# Patient Record
Sex: FEMALE | Race: OTHER | NOT HISPANIC OR LATINO | Employment: PART TIME | URBAN - METROPOLITAN AREA
[De-identification: names, ages, dates, MRNs, and addresses within clinical notes are randomized per-mention and may not be internally consistent; named-entity substitution may affect disease eponyms.]

---

## 2023-09-20 ENCOUNTER — OFFICE VISIT (OUTPATIENT)
Dept: PRIMARY CARE | Facility: CLINIC | Age: 33
End: 2023-09-20
Payer: COMMERCIAL

## 2023-09-20 VITALS
BODY MASS INDEX: 31.33 KG/M2 | OXYGEN SATURATION: 98 % | HEIGHT: 60 IN | WEIGHT: 159.6 LBS | SYSTOLIC BLOOD PRESSURE: 118 MMHG | DIASTOLIC BLOOD PRESSURE: 87 MMHG | HEART RATE: 70 BPM

## 2023-09-20 DIAGNOSIS — Z00.00 ANNUAL PHYSICAL EXAM: ICD-10-CM

## 2023-09-20 DIAGNOSIS — N83.9 LESION OF RIGHT OVARY: Primary | ICD-10-CM

## 2023-09-20 PROCEDURE — 99214 OFFICE O/P EST MOD 30 MIN: CPT | Performed by: INTERNAL MEDICINE

## 2023-09-20 PROCEDURE — 1036F TOBACCO NON-USER: CPT | Performed by: INTERNAL MEDICINE

## 2023-09-20 RX ORDER — FAMOTIDINE 20 MG/1
20 TABLET, FILM COATED ORAL DAILY PRN
COMMUNITY

## 2023-09-20 RX ORDER — MULTIVITAMIN
1 TABLET ORAL DAILY
COMMUNITY

## 2023-09-20 ASSESSMENT — PATIENT HEALTH QUESTIONNAIRE - PHQ9
1. LITTLE INTEREST OR PLEASURE IN DOING THINGS: NOT AT ALL
SUM OF ALL RESPONSES TO PHQ9 QUESTIONS 1 AND 2: 0
2. FEELING DOWN, DEPRESSED OR HOPELESS: NOT AT ALL

## 2023-09-20 ASSESSMENT — ENCOUNTER SYMPTOMS
FEVER: 0
PALPITATIONS: 0
ARTHRALGIAS: 0
MYALGIAS: 0
DYSURIA: 0
HEMATURIA: 0
HEADACHES: 0
CHILLS: 0
DIFFICULTY URINATING: 0
DIZZINESS: 0
DIARRHEA: 0
WEAKNESS: 0
COUGH: 0
FREQUENCY: 0
SORE THROAT: 0
LIGHT-HEADEDNESS: 0
FATIGUE: 0
CONSTIPATION: 0
SHORTNESS OF BREATH: 0
NAUSEA: 0
VOMITING: 0

## 2023-09-20 ASSESSMENT — PAIN SCALES - GENERAL: PAINLEVEL: 0-NO PAIN

## 2023-09-20 NOTE — PROGRESS NOTES
Subjective   Patient ID: Elizabeth Jorge is a 33 y.o. female who presents for lesion on the right ovary and general health management.  BN    Chart was reconciled today as the patient is new to [facility].  Patient was not being evaluated by urologist for kidney stones when they found a lesion on her right ovary.  Patient follows on a CAT scan and wanted follow-up.  She has an appointment with an OB/GYN but she could not get in for another 2 months so she is seeing me first.    Patient has a few spotting for her menstrual cycle for few days followed by a few heavy days and then spotting again.  Menstrual cycles are regular and have been like this for some time now.  She has no specific change or new symptoms such as severe abdominal pain or cramping.  She does have pain and cramping but it is at about its baseline for her.  Her mother does have breast cancer but it was tested and wasNegative for all genetic components and patient has no other family members with any breast or ovarian cancers.        Review of Systems   Constitutional:  Negative for chills, fatigue and fever.   HENT:  Negative for sore throat.    Eyes:  Negative for visual disturbance.   Respiratory:  Negative for cough and shortness of breath.    Cardiovascular:  Negative for chest pain, palpitations and leg swelling.   Gastrointestinal:  Negative for constipation, diarrhea, nausea and vomiting.   Genitourinary:  Negative for difficulty urinating, dysuria, frequency, hematuria and urgency.   Musculoskeletal:  Negative for arthralgias and myalgias.   Skin:  Negative for rash.   Neurological:  Negative for dizziness, syncope, weakness, light-headedness and headaches.       Objective   Medication Documentation Review Audit    **Prior to Admission medications have not yet been reviewed**       Allergies   Allergen Reactions    Hydrocodone Itching     Physical Exam  Constitutional:       Appearance: Normal appearance.   HENT:      Head: Normocephalic and  atraumatic.      Nose: Nose normal.   Eyes:      Extraocular Movements: Extraocular movements intact.      Pupils: Pupils are equal, round, and reactive to light.   Cardiovascular:      Rate and Rhythm: Normal rate and regular rhythm.   Pulmonary:      Breath sounds: Normal breath sounds.   Abdominal:      General: Abdomen is flat. Bowel sounds are normal.      Palpations: Abdomen is soft.   Musculoskeletal:      Right lower leg: No edema.      Left lower leg: No edema.   Neurological:      Mental Status: She is alert.     /87 (BP Location: Left arm, Patient Position: Sitting)   Pulse 70   Ht 1.524 m (5') Comment: with shoes  Wt 72.4 kg (159 lb 9.6 oz)   SpO2 98%   BMI 31.17 kg/m²       Assessment/Plan   Problem List Items Addressed This Visit       Annual physical exam     Patient has not been seen in a couple of years so I will check baseline labs as well.         Relevant Orders    Lipid Panel    CBC    Comprehensive Metabolic Panel    TSH with reflex to Free T4 if abnormal    Vitamin D 25-Hydroxy,Total (for eval of Vitamin D levels)    Lesion of right ovary - Primary     2.5 cm incidental cyst and/or lesion found on the right ovary.  Recommend ultrasound transvaginal and trans abdominal for further evaluation.  If this is normal no further testing is needed however if abnormalities are found we will determine at that time if she needs follow-up and with him.             Relevant Orders    US pelvis transvaginal    US pelvis              It has been a pleasure seeing you.  Halima Lew MD

## 2023-09-20 NOTE — ASSESSMENT & PLAN NOTE
2.5 cm incidental cyst and/or lesion found on the right ovary.  Recommend ultrasound transvaginal and trans abdominal for further evaluation.  If this is normal no further testing is needed however if abnormalities are found we will determine at that time if she needs follow-up and with him.

## 2023-10-02 ENCOUNTER — ANCILLARY PROCEDURE (OUTPATIENT)
Dept: RADIOLOGY | Facility: CLINIC | Age: 33
End: 2023-10-02
Payer: COMMERCIAL

## 2023-10-02 ENCOUNTER — LAB (OUTPATIENT)
Dept: LAB | Facility: LAB | Age: 33
End: 2023-10-02
Payer: COMMERCIAL

## 2023-10-02 DIAGNOSIS — N83.9 LESION OF RIGHT OVARY: ICD-10-CM

## 2023-10-02 DIAGNOSIS — Z00.00 ANNUAL PHYSICAL EXAM: ICD-10-CM

## 2023-10-02 LAB
25(OH)D3 SERPL-MCNC: 34 NG/ML (ref 30–100)
ALBUMIN SERPL BCP-MCNC: 4.3 G/DL (ref 3.4–5)
ALP SERPL-CCNC: 60 U/L (ref 33–110)
ALT SERPL W P-5'-P-CCNC: 16 U/L (ref 7–45)
ANION GAP SERPL CALC-SCNC: 8 MMOL/L (ref 10–20)
AST SERPL W P-5'-P-CCNC: 13 U/L (ref 9–39)
BILIRUB SERPL-MCNC: 0.3 MG/DL (ref 0–1.2)
BUN SERPL-MCNC: 10 MG/DL (ref 6–23)
CALCIUM SERPL-MCNC: 9.2 MG/DL (ref 8.6–10.6)
CHLORIDE SERPL-SCNC: 106 MMOL/L (ref 98–107)
CHOLEST SERPL-MCNC: 184 MG/DL (ref 0–199)
CHOLESTEROL/HDL RATIO: 3.6
CO2 SERPL-SCNC: 29 MMOL/L (ref 21–32)
CREAT SERPL-MCNC: 0.73 MG/DL (ref 0.5–1.05)
ERYTHROCYTE [DISTWIDTH] IN BLOOD BY AUTOMATED COUNT: 15.9 % (ref 11.5–14.5)
GFR SERPL CREATININE-BSD FRML MDRD: >90 ML/MIN/1.73M*2
GLUCOSE SERPL-MCNC: 93 MG/DL (ref 74–99)
HCT VFR BLD AUTO: 37.2 % (ref 36–46)
HDLC SERPL-MCNC: 50.6 MG/DL
HGB BLD-MCNC: 12.2 G/DL (ref 12–16)
LDLC SERPL CALC-MCNC: 99 MG/DL (ref 130–180)
MCH RBC QN AUTO: 27.1 PG (ref 26–34)
MCHC RBC AUTO-ENTMCNC: 32.8 G/DL (ref 32–36)
MCV RBC AUTO: 83 FL (ref 80–100)
NON HDL CHOLESTEROL: 133 MG/DL (ref 0–149)
NRBC BLD-RTO: 0 /100 WBCS (ref 0–0)
PLATELET # BLD AUTO: 248 X10*3/UL (ref 150–450)
PMV BLD AUTO: 10.5 FL (ref 7.5–11.5)
POTASSIUM SERPL-SCNC: 4.2 MMOL/L (ref 3.5–5.3)
PROT SERPL-MCNC: 6.4 G/DL (ref 6.4–8.2)
RBC # BLD AUTO: 4.5 X10*6/UL (ref 4–5.2)
SODIUM SERPL-SCNC: 139 MMOL/L (ref 136–145)
TRIGL SERPL-MCNC: 171 MG/DL (ref 0–149)
TSH SERPL-ACNC: 2.02 MIU/L (ref 0.44–3.98)
VLDL: 34 MG/DL (ref 0–40)
WBC # BLD AUTO: 5.8 X10*3/UL (ref 4.4–11.3)

## 2023-10-02 PROCEDURE — 84443 ASSAY THYROID STIM HORMONE: CPT

## 2023-10-02 PROCEDURE — 76830 TRANSVAGINAL US NON-OB: CPT | Performed by: RADIOLOGY

## 2023-10-02 PROCEDURE — 76856 US EXAM PELVIC COMPLETE: CPT | Performed by: RADIOLOGY

## 2023-10-02 PROCEDURE — 76830 TRANSVAGINAL US NON-OB: CPT

## 2023-10-02 PROCEDURE — 85027 COMPLETE CBC AUTOMATED: CPT

## 2023-10-02 PROCEDURE — 80061 LIPID PANEL: CPT

## 2023-10-02 PROCEDURE — 76856 US EXAM PELVIC COMPLETE: CPT

## 2023-10-02 PROCEDURE — 80053 COMPREHEN METABOLIC PANEL: CPT

## 2023-10-02 PROCEDURE — 36415 COLL VENOUS BLD VENIPUNCTURE: CPT

## 2023-10-02 PROCEDURE — 82306 VITAMIN D 25 HYDROXY: CPT

## 2023-10-03 ENCOUNTER — TELEPHONE (OUTPATIENT)
Dept: PRIMARY CARE | Facility: CLINIC | Age: 33
End: 2023-10-03
Payer: COMMERCIAL

## 2023-10-04 ENCOUNTER — TELEPHONE (OUTPATIENT)
Dept: PRIMARY CARE | Facility: CLINIC | Age: 33
End: 2023-10-04
Payer: COMMERCIAL

## 2023-10-23 PROBLEM — O24.419 GESTATIONAL DIABETES MELLITUS (HHS-HCC): Status: ACTIVE | Noted: 2023-10-23

## 2023-10-23 PROBLEM — M79.10 MYALGIA: Status: ACTIVE | Noted: 2023-10-23

## 2023-10-23 PROBLEM — M53.3 PAIN OF LEFT SACROILIAC JOINT: Status: ACTIVE | Noted: 2023-10-23

## 2023-10-23 PROBLEM — R06.02 SOB (SHORTNESS OF BREATH): Status: ACTIVE | Noted: 2023-10-23

## 2023-10-23 PROBLEM — R10.32 LEFT LOWER QUADRANT PAIN: Status: ACTIVE | Noted: 2023-10-23

## 2023-10-23 PROBLEM — M54.30 SCIATICA: Status: ACTIVE | Noted: 2023-10-23

## 2023-10-23 PROBLEM — R10.13 EPIGASTRIC PAIN: Status: ACTIVE | Noted: 2023-10-23

## 2023-10-23 PROBLEM — M79.7 FIBROMYALGIA: Status: ACTIVE | Noted: 2023-10-23

## 2023-10-23 PROBLEM — J45.909 HYPERACTIVE AIRWAY DISEASE (HHS-HCC): Status: ACTIVE | Noted: 2023-10-23

## 2023-10-23 PROBLEM — M25.552 LEFT HIP PAIN: Status: ACTIVE | Noted: 2023-10-23

## 2023-10-23 RX ORDER — CELECOXIB 100 MG/1
1 CAPSULE ORAL 2 TIMES DAILY PRN
COMMUNITY
End: 2023-10-25

## 2023-10-23 RX ORDER — ACETAMINOPHEN 500 MG
5000 TABLET ORAL DAILY
COMMUNITY
End: 2023-10-25

## 2023-10-25 ENCOUNTER — OFFICE VISIT (OUTPATIENT)
Dept: OBSTETRICS AND GYNECOLOGY | Facility: CLINIC | Age: 33
End: 2023-10-25
Payer: COMMERCIAL

## 2023-10-25 VITALS
BODY MASS INDEX: 31.69 KG/M2 | DIASTOLIC BLOOD PRESSURE: 87 MMHG | SYSTOLIC BLOOD PRESSURE: 130 MMHG | HEIGHT: 60 IN | WEIGHT: 161.4 LBS

## 2023-10-25 DIAGNOSIS — N92.6 IRREGULAR MENSES: ICD-10-CM

## 2023-10-25 DIAGNOSIS — Z12.31 OTHER SCREENING MAMMOGRAM: ICD-10-CM

## 2023-10-25 DIAGNOSIS — Z01.419 ENCOUNTER FOR ANNUAL ROUTINE GYNECOLOGICAL EXAMINATION: Primary | ICD-10-CM

## 2023-10-25 DIAGNOSIS — Z01.419 PAP SMEAR, AS PART OF ROUTINE GYNECOLOGICAL EXAMINATION: ICD-10-CM

## 2023-10-25 PROCEDURE — 3048F LDL-C <100 MG/DL: CPT | Performed by: OBSTETRICS & GYNECOLOGY

## 2023-10-25 PROCEDURE — 99213 OFFICE O/P EST LOW 20 MIN: CPT | Performed by: OBSTETRICS & GYNECOLOGY

## 2023-10-25 PROCEDURE — 1036F TOBACCO NON-USER: CPT | Performed by: OBSTETRICS & GYNECOLOGY

## 2023-10-25 PROCEDURE — 88175 CYTOPATH C/V AUTO FLUID REDO: CPT

## 2023-10-25 PROCEDURE — 3075F SYST BP GE 130 - 139MM HG: CPT | Performed by: OBSTETRICS & GYNECOLOGY

## 2023-10-25 PROCEDURE — 3079F DIAST BP 80-89 MM HG: CPT | Performed by: OBSTETRICS & GYNECOLOGY

## 2023-10-25 PROCEDURE — 87624 HPV HI-RISK TYP POOLED RSLT: CPT

## 2023-10-25 RX ORDER — NORGESTIMATE AND ETHINYL ESTRADIOL 7DAYSX3 28
1 KIT ORAL DAILY
Qty: 28 TABLET | Refills: 11 | Status: SHIPPED | OUTPATIENT
Start: 2023-10-25 | End: 2024-10-24

## 2023-10-25 NOTE — PROGRESS NOTES
Subjective   Patient ID: Elizabeth Jorge is a 33 y.o. female who presents for Abnormal CT Scan (New patient here for ovarian cyst on ct scan--denies any pain/Declined chaperone).  HPI  Patient is a 33-year-old  4 para 3 white female whose had 3  sections and 1 miscarriage.  Her last menstrual period was 2023 she said they typically come 1 month apart normally she would bleed for about 6 days but recently would have some spotting just before and just after sometimes lasting as long as 10 days.  Currently does not use anything for birth control.  She admits to regular bowel movements denies any urinary symptoms.  Medical history significant for kidney stones.  Surgical history significant for C-sections as above and colposcopy.  Patient states she had a normal Pap smear about 3 years ago.  She denies cigarette smoking currently works as a nurse at [employer].  Family history significant for mother with breast cancer.    Patient had CAT scan in 2023 for kidney stone was noted to have 2.5 cm lesion on right ovary.  Had ultrasound done 2023 which was completely normal.  Review of Systems  Abdomen: No abdominal pain, nausea, vomiting, diarrhea, or constipation. No bloating, early satiety, indigestion, or increased flatulence.  Bladder: No dysuria, gross hematuria, urinary frequency, urinary urgency, or incontinence.  Breast: No breast lumps, nipple discharge, overlying skin changes, redness or skin retraction.  Objective   Physical Exam  Gen.: Alert and in no acute distress. Well-developed, well-nourished.  Thyroid: Nonenlarged and no palpable thyroid nodules  Cardiovascular: Heart regular rate and rhythm  Pulmonary: Clear bilateral breath sounds  Breasts: Normal appearance, no nipple discharge and no skin changes. No palpable masses and no axillary lymphadenopathy  Abdomen: Soft and nontender, no abdominal mass palpated, no organomegaly   Pelvic: External genitalia normal,  Bartholin's urethral and [place]'s glands normal. Vagina normal. Cervix normal. Uterus normal size and shape. Right adnexa normal without masses. Left adnexa normal without masses. Perianal area and normal.  Assessment/Plan   Annual GYN exam, Pap and HPV done, patient given a requisition for mammogram due to history of mother with breast cancer.    Reviewed CAT scan and ultrasound findings at length with patient, at this point suspect functional cyst was noted on CAT scan.  We will follow expectantly in light of normal ultrasound.    Irregular menses.  Discussed history at length, will begin low-dose birth control pills.  Risk benefits alternatives explained and she agreed.  Patient will contact office in 3 months to reassess her symptoms.  She will follow-up in 1 year or as needed.

## 2023-11-07 LAB
CYTOLOGY CMNT CVX/VAG CYTO-IMP: NORMAL
HPV HR GENOTYPES PNL CVX NAA+PROBE: NEGATIVE
LAB AP HPV GENOTYPE QUESTION: NO
LAB AP HPV HR: NORMAL
LABORATORY COMMENT REPORT: NORMAL
LMP START DATE: NORMAL
PATH REPORT.TOTAL CANCER: NORMAL

## 2023-11-30 ENCOUNTER — OFFICE VISIT (OUTPATIENT)
Dept: PRIMARY CARE | Facility: CLINIC | Age: 33
End: 2023-11-30
Payer: COMMERCIAL

## 2023-11-30 VITALS
OXYGEN SATURATION: 98 % | HEIGHT: 60 IN | DIASTOLIC BLOOD PRESSURE: 77 MMHG | WEIGHT: 162.4 LBS | SYSTOLIC BLOOD PRESSURE: 104 MMHG | HEART RATE: 79 BPM | BODY MASS INDEX: 31.88 KG/M2

## 2023-11-30 DIAGNOSIS — Z00.00 ANNUAL PHYSICAL EXAM: Primary | ICD-10-CM

## 2023-11-30 PROBLEM — R06.02 SOB (SHORTNESS OF BREATH): Status: RESOLVED | Noted: 2023-10-23 | Resolved: 2023-11-30

## 2023-11-30 PROBLEM — J45.909 HYPERACTIVE AIRWAY DISEASE (HHS-HCC): Status: RESOLVED | Noted: 2023-10-23 | Resolved: 2023-11-30

## 2023-11-30 PROBLEM — R10.13 EPIGASTRIC PAIN: Status: RESOLVED | Noted: 2023-10-23 | Resolved: 2023-11-30

## 2023-11-30 PROCEDURE — 3048F LDL-C <100 MG/DL: CPT | Performed by: INTERNAL MEDICINE

## 2023-11-30 PROCEDURE — 3078F DIAST BP <80 MM HG: CPT | Performed by: INTERNAL MEDICINE

## 2023-11-30 PROCEDURE — 3074F SYST BP LT 130 MM HG: CPT | Performed by: INTERNAL MEDICINE

## 2023-11-30 PROCEDURE — 99395 PREV VISIT EST AGE 18-39: CPT | Performed by: INTERNAL MEDICINE

## 2023-11-30 PROCEDURE — 1036F TOBACCO NON-USER: CPT | Performed by: INTERNAL MEDICINE

## 2023-11-30 ASSESSMENT — ENCOUNTER SYMPTOMS
MYALGIAS: 0
ARTHRALGIAS: 0
WEAKNESS: 0
DYSURIA: 0
DIFFICULTY URINATING: 0
FEVER: 0
FREQUENCY: 0
HEADACHES: 0
CHILLS: 0
VOMITING: 0
FATIGUE: 0
LIGHT-HEADEDNESS: 0
PALPITATIONS: 0
DIZZINESS: 0
HEMATURIA: 0
SHORTNESS OF BREATH: 0
DIARRHEA: 0
COUGH: 0
NAUSEA: 0
CONSTIPATION: 0
SORE THROAT: 0

## 2023-11-30 ASSESSMENT — PAIN SCALES - GENERAL: PAINLEVEL: 0-NO PAIN

## 2023-11-30 NOTE — ASSESSMENT & PLAN NOTE
Patient just completed lab work and it was reviewed with her in detail today.  She notes she is actually having less frequent and severe headaches.  In the evenings she feels PVCs and I have asked her to correlate them and see if they are related to more stress, elevated in the take of caffeine or if she gets more of them related to her menstrual cycle    Patient has noticed some elevated blood pressure readings at home as well.  She is to start recording the day time blood pressure and pulse and see if there is or any correlation with her menstrual cycle, salt intake or travel.  I have also recommended she wear compression hose anytime she started getting leg swelling, works too much or is in a car for travel more than 2 hours.    If her blood pressure readings are consistently over 130/80 she is to bring them into me and see me for possible evaluation of hypertension    Unless something else comes up I would like to see her back in 1 year for an annual physical.

## 2023-11-30 NOTE — PROGRESS NOTES
Subjective   Patient ID: Elizabeth Jorge is a 33 y.o. female who presents for an annual physical.  BN    Patient is here for an annual physical.  She sees Dr. Deutsch for her annual Pap and pelvic.  She has noticed some elevated blood pressure readings from home periodically although in the office today it is good.  She has also noticed intermittent some pitting leg edema.  Typically last for a day or 2 and then subsides.  She has not noticed if there is a correlation between her menstrual cycles work salty intake or temperature with the swelling in her ankles.              Review of Systems   Constitutional:  Negative for chills, fatigue and fever.   HENT:  Negative for sore throat.    Eyes:  Negative for visual disturbance.   Respiratory:  Negative for cough and shortness of breath.    Cardiovascular:  Negative for chest pain, palpitations and leg swelling.        Patient denies having palpitations but says in the evenings when she is relaxing she can feel extra beats or PVCs.  She has recorded them and called them on her Apple Watch and notices it is just a few random beats.  She does not have any associated symptoms such as chest tightness or shortness of breath with them.   Gastrointestinal:  Negative for constipation, diarrhea, nausea and vomiting.   Genitourinary:  Negative for difficulty urinating, dysuria, frequency, hematuria and urgency.   Musculoskeletal:  Negative for arthralgias and myalgias.   Skin:  Negative for rash.   Neurological:  Negative for dizziness, syncope, weakness, light-headedness and headaches.       Objective   Medication Documentation Review Audit       Reviewed by Halima Lew MD (Physician) on 11/30/23 at 0954      Medication Order Taking? Sig Documenting Provider Last Dose Status   famotidine (Pepcid) 20 mg tablet [id number]  Take 1 tablet (20 mg) by mouth once daily as needed for heartburn. Historical Provider, MD  Active   multivitamin tablet [id number]  Take 1 tablet by mouth  once daily. Historical Provider, MD  Active   norgestimate-ethinyl estradioL (Ortho Tri-Cyclen,Trinessa) 0.18/0.215/0.25 mg-35 mcg (28) tablet [id number]  Take 1 tablet by mouth once daily. Matt Washington MD  Active                  Allergies   Allergen Reactions    Hydrocodone Itching     Physical Exam  Constitutional:       Appearance: Normal appearance.   HENT:      Head: Normocephalic and atraumatic.      Nose: Nose normal.   Eyes:      Extraocular Movements: Extraocular movements intact.      Pupils: Pupils are equal, round, and reactive to light.   Cardiovascular:      Rate and Rhythm: Normal rate and regular rhythm.   Pulmonary:      Breath sounds: Normal breath sounds.   Abdominal:      General: Abdomen is flat. Bowel sounds are normal.      Palpations: Abdomen is soft.   Musculoskeletal:      Right lower leg: No edema.      Left lower leg: No edema.   Neurological:      Mental Status: She is alert.     /77 (BP Location: Left arm, Patient Position: Sitting)   Pulse 79   Ht 1.524 m (5') Comment: with shoes on  Wt 73.7 kg (162 lb 6.4 oz)   SpO2 98%   BMI 31.72 kg/m²       Assessment/Plan   Problem List Items Addressed This Visit       Annual physical exam - Primary     Patient just completed lab work and it was reviewed with her in detail today.  She notes she is actually having less frequent and severe headaches.  In the evenings she feels PVCs and I have asked her to correlate them and see if they are related to more stress, elevated in the take of caffeine or if she gets more of them related to her menstrual cycle    Patient has noticed some elevated blood pressure readings at home as well.  She is to start recording the day time blood pressure and pulse and see if there is or any correlation with her menstrual cycle, salt intake or travel.  I have also recommended she wear compression hose anytime she started getting leg swelling, works too much or is in a car for travel more than 2 hours.    If  her blood pressure readings are consistently over 130/80 she is to bring them into me and see me for possible evaluation of hypertension    Unless something else comes up I would like to see her back in 1 year for an annual physical.                   It has been a pleasure seeing you.  Halima Lew MD

## 2023-12-08 ENCOUNTER — ANCILLARY PROCEDURE (OUTPATIENT)
Dept: RADIOLOGY | Facility: CLINIC | Age: 33
End: 2023-12-08
Payer: COMMERCIAL

## 2023-12-08 DIAGNOSIS — Z12.31 OTHER SCREENING MAMMOGRAM: ICD-10-CM

## 2023-12-08 PROCEDURE — 77063 BREAST TOMOSYNTHESIS BI: CPT | Performed by: RADIOLOGY

## 2023-12-08 PROCEDURE — 77063 BREAST TOMOSYNTHESIS BI: CPT

## 2023-12-08 PROCEDURE — 77067 SCR MAMMO BI INCL CAD: CPT | Performed by: RADIOLOGY

## 2024-10-15 ENCOUNTER — ANCILLARY PROCEDURE (OUTPATIENT)
Dept: URGENT CARE | Age: 34
End: 2024-10-15
Payer: COMMERCIAL

## 2024-10-15 ENCOUNTER — APPOINTMENT (OUTPATIENT)
Dept: PRIMARY CARE | Facility: CLINIC | Age: 34
End: 2024-10-15
Payer: COMMERCIAL

## 2024-10-15 ENCOUNTER — OFFICE VISIT (OUTPATIENT)
Dept: URGENT CARE | Age: 34
End: 2024-10-15
Payer: COMMERCIAL

## 2024-10-15 ENCOUNTER — TELEPHONE (OUTPATIENT)
Dept: PRIMARY CARE | Facility: CLINIC | Age: 34
End: 2024-10-15

## 2024-10-15 VITALS
BODY MASS INDEX: 31.8 KG/M2 | HEART RATE: 99 BPM | WEIGHT: 162 LBS | RESPIRATION RATE: 18 BRPM | HEIGHT: 60 IN | SYSTOLIC BLOOD PRESSURE: 127 MMHG | DIASTOLIC BLOOD PRESSURE: 90 MMHG | TEMPERATURE: 98.1 F

## 2024-10-15 DIAGNOSIS — S89.92XA LOWER LEG INJURY, LEFT, INITIAL ENCOUNTER: Primary | ICD-10-CM

## 2024-10-15 DIAGNOSIS — S89.91XA INJURY OF RIGHT LOWER LEG, INITIAL ENCOUNTER: ICD-10-CM

## 2024-10-15 DIAGNOSIS — S89.92XA LOWER LEG INJURY, LEFT, INITIAL ENCOUNTER: ICD-10-CM

## 2024-10-15 PROCEDURE — 99213 OFFICE O/P EST LOW 20 MIN: CPT

## 2024-10-15 PROCEDURE — 1036F TOBACCO NON-USER: CPT

## 2024-10-15 PROCEDURE — 3080F DIAST BP >= 90 MM HG: CPT

## 2024-10-15 PROCEDURE — 3074F SYST BP LT 130 MM HG: CPT

## 2024-10-15 PROCEDURE — 3008F BODY MASS INDEX DOCD: CPT

## 2024-10-15 ASSESSMENT — ENCOUNTER SYMPTOMS
CONSTITUTIONAL NEGATIVE: 1
LEG PAIN: 1
ARTHRALGIAS: 1

## 2024-10-15 ASSESSMENT — PAIN SCALES - GENERAL: PAINLEVEL: 1

## 2024-10-15 NOTE — TELEPHONE ENCOUNTER
Happened on Friday- was hit on front of both legs by a large tire swing. Both are swollen, bruising, slightly red around scrape areas. Very painful. Asking to be seen today. May I work in.  [phone]

## 2024-10-15 NOTE — PROGRESS NOTES
Subjective   Patient ID: Elizabeth Jorge is a 34 y.o. female. They present today with a chief complaint of Leg Pain.    History of Present Illness  34-year-old female presents to clinic today with complaints of bilateral lower leg injury.  Patient states over the weekend she was pushing a tire swing with her kids on it and it swung back hitting her in bilateral shins.  She states that she has had an increase in swelling in the area.  She states she had a baseline increase in swelling but believes this made it worse.  She has a numb area to the left anterior shin.  Able to ambulate.      Leg Pain         Past Medical History  Allergies as of 10/15/2024 - Reviewed 10/15/2024   Allergen Reaction Noted    Hydrocodone Itching 2017       (Not in a hospital admission)       Past Medical History:   Diagnosis Date    Acute pharyngitis, unspecified 2016    Acute viral pharyngitis    Acute upper respiratory infection, unspecified 2016    Viral URI with cough    Encounter for screening for malignant neoplasm of vagina     Vaginal Pap smear    Other conditions influencing health status 2018    History of pregnancy    Personal history of diseases of the blood and blood-forming organs and certain disorders involving the immune mechanism 2014    History of anemia    Personal history of other diseases of the respiratory system 05/15/2018    History of sinusitis    Personal history of other diseases of the respiratory system 2017    History of paranasal sinus congestion    Unspecified conjunctivitis 2017    Bacterial conjunctivitis of left eye    Urinary tract infection, site not specified 2017    Bacterial UTI       Past Surgical History:   Procedure Laterality Date     SECTION, CLASSIC  2020     Section    OTHER SURGICAL HISTORY  2017    Cervical Conization    WISDOM TOOTH EXTRACTION          reports that she quit smoking about 12 years ago. Her smoking  use included cigarettes. She has been exposed to tobacco smoke. She has never used smokeless tobacco. She reports current alcohol use of about 3.0 standard drinks of alcohol per week. She reports that she does not use drugs.    Review of Systems  Review of Systems   Constitutional: Negative.    Musculoskeletal:  Positive for arthralgias.                                  Objective    Vitals:    10/15/24 1153   BP: 127/90   Pulse: 99   Resp: 18   Temp: 36.7 °C (98.1 °F)   TempSrc: Oral   Weight: 73.5 kg (162 lb)   Height: 1.524 m (5')     No LMP recorded.    Physical Exam  Constitutional:       Appearance: Normal appearance.   Musculoskeletal:      Comments: Tenderness to tibial shaft bilateral.  Abrasions to right shin, mild ecchymosis medial left shin   Neurological:      Mental Status: She is alert.         Procedures    Point of Care Test & Imaging Results from this visit  No results found for this visit on 10/15/24.   XR tibia fibula left 2 views    Result Date: 10/15/2024  Interpreted By:  Roslyn Gifford, STUDY: Left tibia and fibula, two views   INDICATION: Signs/Symptoms:hit with swing.   COMPARISON: None.   ACCESSION NUMBER(S): [id number]   ORDERING CLINICIAN: ASIYA WORTHINGTON   FINDINGS: No acute fracture or malalignment. No significant degenerative changes. Visualized knee and ankle joints are unremarkable. Soft tissues are unremarkable.       No acute fracture or malalignment of the left tibia or fibula.   MACRO: None.   Signed by: Roslyn Gifford 10/15/2024 12:48 PM Dictation workstation:   [id number]    XR tibia fibula right 2 views    Result Date: 10/15/2024  Interpreted By:  Roslyn Gifford, STUDY: Right tibia and fibula, two views   INDICATION: Signs/Symptoms:hit with swing.   COMPARISON: None.   ACCESSION NUMBER(S): [id number]   ORDERING CLINICIAN: ASIYA WORTHINGTON   FINDINGS: No acute fracture or malalignment. No significant degenerative changes. Visualized knee and ankle joints are  unremarkable. Soft tissues are unremarkable.       No acute fracture or malalignment of the right tibia and fibula.   MACRO: None.   Signed by: Roslyn Gifford 10/15/2024 12:48 PM Dictation workstation:   [id number]     Diagnostic study results (if any) were reviewed by [facility].    Assessment/Plan   Allergies, medications, history, and pertinent labs/EKGs/Imaging reviewed by Monico Hicks PA-C.     Medical Decision Making  X-rays negative for any acute fracture.  Discussed with patient this is a soft tissue injury.  Elevate, ice as well as Tylenol and ibuprofen.    Orders and Diagnoses  Diagnoses and all orders for this visit:  Lower leg injury, left, initial encounter  -     XR tibia fibula left 2 views; Future  Injury of right lower leg, initial encounter  -     XR tibia fibula right 2 views; Future      Medical Admin Record      Patient disposition: Home    Electronically signed by [facility]  12:52 PM

## 2024-10-28 ENCOUNTER — HOSPITAL ENCOUNTER (OUTPATIENT)
Dept: RADIOLOGY | Facility: HOSPITAL | Age: 34
Discharge: HOME | End: 2024-10-28
Payer: COMMERCIAL

## 2024-10-28 DIAGNOSIS — N20.0 CALCULUS OF KIDNEY: ICD-10-CM

## 2024-10-28 PROCEDURE — 74018 RADEX ABDOMEN 1 VIEW: CPT | Performed by: RADIOLOGY

## 2024-10-28 PROCEDURE — 74018 RADEX ABDOMEN 1 VIEW: CPT

## 2024-11-05 ENCOUNTER — APPOINTMENT (OUTPATIENT)
Dept: OBSTETRICS AND GYNECOLOGY | Facility: CLINIC | Age: 34
End: 2024-11-05
Payer: COMMERCIAL

## 2024-11-05 VITALS
SYSTOLIC BLOOD PRESSURE: 124 MMHG | BODY MASS INDEX: 31.98 KG/M2 | DIASTOLIC BLOOD PRESSURE: 86 MMHG | WEIGHT: 162.9 LBS | HEIGHT: 60 IN

## 2024-11-05 DIAGNOSIS — Z12.31 OTHER SCREENING MAMMOGRAM: ICD-10-CM

## 2024-11-05 DIAGNOSIS — Z01.419 ENCOUNTER FOR ANNUAL ROUTINE GYNECOLOGICAL EXAMINATION: Primary | ICD-10-CM

## 2024-11-05 PROCEDURE — 3074F SYST BP LT 130 MM HG: CPT | Performed by: OBSTETRICS & GYNECOLOGY

## 2024-11-05 PROCEDURE — 1036F TOBACCO NON-USER: CPT | Performed by: OBSTETRICS & GYNECOLOGY

## 2024-11-05 PROCEDURE — 3079F DIAST BP 80-89 MM HG: CPT | Performed by: OBSTETRICS & GYNECOLOGY

## 2024-11-05 PROCEDURE — 3008F BODY MASS INDEX DOCD: CPT | Performed by: OBSTETRICS & GYNECOLOGY

## 2024-11-05 PROCEDURE — 99395 PREV VISIT EST AGE 18-39: CPT | Performed by: OBSTETRICS & GYNECOLOGY

## 2024-11-05 RX ORDER — NORETHINDRONE 0.35 MG/1
1 TABLET ORAL DAILY
Qty: 28 TABLET | Refills: 11 | Status: SHIPPED | OUTPATIENT
Start: 2024-11-05 | End: 2025-11-05

## 2024-11-05 ASSESSMENT — PROMIS GLOBAL HEALTH SCALE
RATE_QUALITY_OF_LIFE: GOOD
CARRYOUT_PHYSICAL_ACTIVITIES: COMPLETELY
RATE_MENTAL_HEALTH: VERY GOOD
RATE_SOCIAL_SATISFACTION: VERY GOOD
CARRYOUT_SOCIAL_ACTIVITIES: VERY GOOD
RATE_AVERAGE_PAIN: 5
RATE_PHYSICAL_HEALTH: GOOD
EMOTIONAL_PROBLEMS: SOMETIMES
RATE_GENERAL_HEALTH: GOOD
RATE_AVERAGE_FATIGUE: MODERATE

## 2024-11-05 NOTE — PROGRESS NOTES
Subjective   Patient ID: Elizabeth Jorge is a 34 y.o. female who presents for Annual Exam (Patient here for annual/Pt has c/o long, heavy cycles/Contraception-none/Declined std screening/Declined chaperone).  HPI  Patient is a 34-year-old  4 para 3 whose had 3  sections.  Her last menstrual period was  she said they are typically 1 month apart she has been having periods last a little bit longer and a little bit heavier typically anywhere from 6 to 8 days 3 to 4 days heavy periods.  Patient was evaluated last year and was started on combination birth control pills but did not like the way they made her feel is off them now.  She is uncertain about future childbearing.  She admits to regular bowel movements denies any urinary symptoms.  Last normal Pap and HPV 2023.  Review of Systems    Objective   Physical Exam  Gen.: Alert and in no acute distress. Well-developed, well-nourished.  Thyroid: Nonenlarged and no palpable thyroid nodules  Cardiovascular: Heart regular rate and rhythm  Pulmonary: Clear bilateral breath sounds  Breasts: Normal appearance, no nipple discharge and no skin changes. No palpable masses and no axillary lymphadenopathy  Abdomen: Soft and nontender, no abdominal mass palpated, no organomegaly   Pelvic: External genitalia normal, [place]'s urethral and [place]'s glands normal. Vagina normal. Cervix normal. Uterus normal size and shape. Right adnexa normal without masses. Left adnexa normal without masses. Perianal area and normal.  Assessment/Plan   Annual GYN exam, Pap and HPV not needed this year, discussed longer heavier menses.  Will start progesterone only birth control.  Risk benefits and alternatives explained and she agreed.  Also discussed other progesterone options and possible ablation.  She will follow-up in 1 year or as needed.         Matt Washington MD 24 12:22 PM

## 2024-11-06 ENCOUNTER — APPOINTMENT (OUTPATIENT)
Dept: PRIMARY CARE | Facility: CLINIC | Age: 34
End: 2024-11-06
Payer: COMMERCIAL

## 2024-11-06 VITALS
SYSTOLIC BLOOD PRESSURE: 122 MMHG | OXYGEN SATURATION: 98 % | WEIGHT: 162.2 LBS | DIASTOLIC BLOOD PRESSURE: 80 MMHG | BODY MASS INDEX: 31.84 KG/M2 | HEIGHT: 60 IN | HEART RATE: 83 BPM

## 2024-11-06 DIAGNOSIS — S89.90XA: ICD-10-CM

## 2024-11-06 DIAGNOSIS — R00.2 PALPITATION: ICD-10-CM

## 2024-11-06 DIAGNOSIS — R00.2 PALPITATIONS: ICD-10-CM

## 2024-11-06 DIAGNOSIS — Z00.00 ANNUAL PHYSICAL EXAM: Primary | ICD-10-CM

## 2024-11-06 DIAGNOSIS — R63.8 UNABLE TO LOSE WEIGHT: ICD-10-CM

## 2024-11-06 PROBLEM — G44.209 TENSION HEADACHE: Status: ACTIVE | Noted: 2024-11-06

## 2024-11-06 PROCEDURE — 3079F DIAST BP 80-89 MM HG: CPT | Performed by: INTERNAL MEDICINE

## 2024-11-06 PROCEDURE — 3008F BODY MASS INDEX DOCD: CPT | Performed by: INTERNAL MEDICINE

## 2024-11-06 PROCEDURE — 99214 OFFICE O/P EST MOD 30 MIN: CPT | Performed by: INTERNAL MEDICINE

## 2024-11-06 PROCEDURE — 3074F SYST BP LT 130 MM HG: CPT | Performed by: INTERNAL MEDICINE

## 2024-11-06 PROCEDURE — 1036F TOBACCO NON-USER: CPT | Performed by: INTERNAL MEDICINE

## 2024-11-06 ASSESSMENT — ENCOUNTER SYMPTOMS
NAUSEA: 0
ABDOMINAL PAIN: 0
LIGHT-HEADEDNESS: 0
TROUBLE SWALLOWING: 0
PALPITATIONS: 1
HEADACHES: 1
DIZZINESS: 0
NECK STIFFNESS: 1
SHORTNESS OF BREATH: 0
COUGH: 0
SORE THROAT: 0
DIARRHEA: 0
DYSURIA: 0
CONSTIPATION: 0
FATIGUE: 0
ARTHRALGIAS: 0
FEVER: 0
FREQUENCY: 0
NECK PAIN: 1
VOMITING: 0

## 2024-11-06 ASSESSMENT — PATIENT HEALTH QUESTIONNAIRE - PHQ9
2. FEELING DOWN, DEPRESSED OR HOPELESS: NOT AT ALL
1. LITTLE INTEREST OR PLEASURE IN DOING THINGS: NOT AT ALL
SUM OF ALL RESPONSES TO PHQ9 QUESTIONS 1 AND 2: 0

## 2024-11-06 ASSESSMENT — PAIN SCALES - GENERAL: PAINLEVEL_OUTOF10: 6

## 2024-11-06 NOTE — ASSESSMENT & PLAN NOTE
Patient is describing pretty classic tension headaches.  Starts in the bottom of the neck bilaterally aches seems to grow up toward the occiput of the skull and around the back of the ears.  I encouraged her to work on proper positioning while she is sleeping at night with a neck pillow as well as making sure she is staring directly at a computer screen and not angling her head up or down.  She can try hot compresses or massage to the area and continue using ibuprofen.  If it worsens or changes she is to let me know and I will do an x-ray of the neck.  We could also try a muscle relaxer like tizanidine to see if it helps.

## 2024-11-06 NOTE — ASSESSMENT & PLAN NOTE
Patient has palpitations about 2-3 times a month.  During 1 episode about 3 weeks ago her watch told her she had A-fib and she is now concerned.  At this time I will order a 2-week cardiac monitor to see if she has any episodes of A-fib.  I did reassure the patient that those watches her frequently and accurate and that she should not panic at this time.

## 2024-11-06 NOTE — PATIENT INSTRUCTIONS
Please get fasting labs    Set up 14 day cardiac monitor    Follow up Dr Lew in 5 to 6 weeks to follow up on results

## 2024-11-06 NOTE — PROGRESS NOTES
Subjective   Patient ID: Elizabeth Jorge is a 34 y.o. female who presents for weight gain and general health management.    Patient is here because of complaints about weight gain and not able to lose weight.  Typically if she puts her mind to it exercises and watches her diet she can drop weight however in the past year every time she goes down 5 pounds she goes right back up again.  She also notes that her Apple Watch told her she had an episode of A-fib and she now notices about 2-3 times a month she feels her race racing heart and suspect she may have some sort of arrhythmia finally she was on a tire swing which jammed into both her shins leaving hematoma on the left anterior shin and a small area of skin breakdown and bruising on the right anterior shin as well.  The left shin is now numb as well and she wonders if this is going to be permanent.  Depending on the degree of damage it may be permanent but I told her since the hematoma is still present it had to resolve completely before she would know if the numbness was permanent or not    Finally the patient complains of neck pain that starts in the base the neck and goes up the base of the skull bilaterally sometimes to the back of the years.  She was worried about a pinched nerve but this sounds more like tension headaches starting at the base and working their way up and around the back makes of the skull.        Review of Systems   Constitutional:  Negative for fatigue and fever.   HENT:  Negative for sore throat and trouble swallowing.    Eyes:  Negative for visual disturbance.   Respiratory:  Negative for cough and shortness of breath.    Cardiovascular:  Positive for palpitations. Negative for chest pain and leg swelling.   Gastrointestinal:  Negative for abdominal pain, constipation, diarrhea, nausea and vomiting.   Genitourinary:  Negative for dysuria and frequency.   Musculoskeletal:  Positive for neck pain and neck stiffness. Negative for  arthralgias.   Skin:  Negative for rash.   Neurological:  Positive for headaches. Negative for dizziness and light-headedness.       Objective   Medication Documentation Review Audit       Reviewed by Halima Lew MD (Physician) on 24 at 1421      Medication Order Taking? Sig Documenting Provider Last Dose Status   famotidine (Pepcid) 20 mg tablet [id number]  Take 1 tablet (20 mg) by mouth once daily as needed for heartburn. Historical Provider, MD  Active   multivitamin tablet [id number]  Take 1 tablet by mouth once daily. Historical Provider, MD  Active   norethindrone (Micronor) 0.35 mg tablet [id number]  Take 1 tablet (0.35 mg) over 28 days by mouth once daily. Matt Washington MD  Active   norgestimate-ethinyl estradioL (Ortho Tri-Cyclen,Trinessa) 0.18/0.215/0.25 mg-35 mcg (28) tablet [id number]  Take 1 tablet by mouth once daily. Matt Washington MD   10/24/24 2359                  Allergies   Allergen Reactions    Hydrocodone Itching       /80   Pulse 83   Ht 1.524 m (5')   Wt 73.6 kg (162 lb 3.2 oz)   LMP 10/25/2024   SpO2 98%   BMI 31.68 kg/m²     Physical Exam  Constitutional:       Appearance: Normal appearance.   HENT:      Head: Normocephalic and atraumatic.      Nose: Nose normal.   Eyes:      Extraocular Movements: Extraocular movements intact.      Pupils: Pupils are equal, round, and reactive to light.   Cardiovascular:      Rate and Rhythm: Normal rate and regular rhythm.   Pulmonary:      Breath sounds: Normal breath sounds.   Abdominal:      General: Abdomen is flat. Bowel sounds are normal.      Palpations: Abdomen is soft.   Musculoskeletal:      Right lower leg: No edema.      Left lower leg: No edema.   Neurological:      Mental Status: She is alert.           Assessment/Plan   Problem List Items Addressed This Visit       Annual physical exam - Primary    Relevant Orders    Lipid Panel    CBC    Comprehensive Metabolic Panel    TSH with reflex to Free T4 if abnormal     Vitamin D 25-Hydroxy,Total (for eval of Vitamin D levels)    Palpitation     Patient has palpitations about 2-3 times a month.  During 1 episode about 3 weeks ago her watch told her she had A-fib and she is now concerned.  At this time I will order a 2-week cardiac monitor to see if she has any episodes of A-fib.  I did reassure the patient that those watches her frequently and accurate and that she should not panic at this time.         Unable to lose weight     Despite repeated efforts of diet and exercise patient is unable to lose weight.  At this time we will repeat her lab work including thyroid electrolytes glucose level to make sure there is not a physiologic reason for this.  If all of this is normal then I would also consider recommending her to weight [employer] for weight loss management and possibly GLP-1 if necessary.         Shin injury     Patient injured both shins on a tire screen.  Right had a small amount of skin breakdown but appears to have healed over nicely.  There is some subcutaneous inflammation presumably from bruised the bone or shin and patient was reassured this should slowly improve.  She also has a small hematoma on the left shin in this same area she complains of numbness.  I encouraged her to massage the area repeatedly to breakdown the hematoma as well as hot compresses.  Whether or not the numbness resolves or not is a matter of time and she will just have to wait and see what happens.  Both of these appear superficial and I do not believe x-rays are needed at this time          Other Visit Diagnoses       Palpitations        Relevant Orders    Holter or Event Cardiac Monitor                   It has been a pleasure seeing you.  Halima Lew MD

## 2024-11-06 NOTE — ASSESSMENT & PLAN NOTE
Despite repeated efforts of diet and exercise patient is unable to lose weight.  At this time we will repeat her lab work including thyroid electrolytes glucose level to make sure there is not a physiologic reason for this.  If all of this is normal then I would also consider recommending her to [employer] for weight loss management and possibly GLP-1 if necessary.

## 2024-11-06 NOTE — ASSESSMENT & PLAN NOTE
Patient injured both shins on a tire screen.  Right had a small amount of skin breakdown but appears to have healed over nicely.  There is some subcutaneous inflammation presumably from bruised the bone or shin and patient was reassured this should slowly improve.  She also has a small hematoma on the left shin in this same area she complains of numbness.  I encouraged her to massage the area repeatedly to breakdown the hematoma as well as hot compresses.  Whether or not the numbness resolves or not is a matter of time and she will just have to wait and see what happens.  Both of these appear superficial and I do not believe x-rays are needed at this time

## 2024-11-12 ENCOUNTER — HOSPITAL ENCOUNTER (OUTPATIENT)
Dept: CARDIOLOGY | Facility: CLINIC | Age: 34
Discharge: HOME | End: 2024-11-12
Payer: COMMERCIAL

## 2024-11-12 DIAGNOSIS — R00.2 PALPITATIONS: ICD-10-CM

## 2024-11-12 PROCEDURE — 93247 EXT ECG>7D<15D SCAN A/R: CPT

## 2024-11-26 ENCOUNTER — LAB (OUTPATIENT)
Dept: LAB | Facility: LAB | Age: 34
End: 2024-11-26
Payer: COMMERCIAL

## 2024-11-26 DIAGNOSIS — Z00.00 ANNUAL PHYSICAL EXAM: ICD-10-CM

## 2024-11-26 LAB
25(OH)D3 SERPL-MCNC: 26 NG/ML (ref 30–100)
ALBUMIN SERPL BCP-MCNC: 4.2 G/DL (ref 3.4–5)
ALP SERPL-CCNC: 60 U/L (ref 33–110)
ALT SERPL W P-5'-P-CCNC: 10 U/L (ref 7–45)
ANION GAP SERPL CALC-SCNC: 9 MMOL/L (ref 10–20)
AST SERPL W P-5'-P-CCNC: 14 U/L (ref 9–39)
BILIRUB SERPL-MCNC: 0.3 MG/DL (ref 0–1.2)
BUN SERPL-MCNC: 10 MG/DL (ref 6–23)
CALCIUM SERPL-MCNC: 9 MG/DL (ref 8.6–10.6)
CHLORIDE SERPL-SCNC: 107 MMOL/L (ref 98–107)
CHOLEST SERPL-MCNC: 181 MG/DL (ref 0–199)
CHOLESTEROL/HDL RATIO: 3.3
CO2 SERPL-SCNC: 27 MMOL/L (ref 21–32)
CREAT SERPL-MCNC: 0.76 MG/DL (ref 0.5–1.05)
EGFRCR SERPLBLD CKD-EPI 2021: >90 ML/MIN/1.73M*2
ERYTHROCYTE [DISTWIDTH] IN BLOOD BY AUTOMATED COUNT: 14.7 % (ref 11.5–14.5)
GLUCOSE SERPL-MCNC: 97 MG/DL (ref 74–99)
HCT VFR BLD AUTO: 32.1 % (ref 36–46)
HDLC SERPL-MCNC: 55.6 MG/DL
HGB BLD-MCNC: 9.7 G/DL (ref 12–16)
LDLC SERPL CALC-MCNC: 99 MG/DL
MCH RBC QN AUTO: 22.5 PG (ref 26–34)
MCHC RBC AUTO-ENTMCNC: 30.2 G/DL (ref 32–36)
MCV RBC AUTO: 75 FL (ref 80–100)
NON HDL CHOLESTEROL: 125 MG/DL (ref 0–149)
NRBC BLD-RTO: 0 /100 WBCS (ref 0–0)
PLATELET # BLD AUTO: 324 X10*3/UL (ref 150–450)
POTASSIUM SERPL-SCNC: 4.2 MMOL/L (ref 3.5–5.3)
PROT SERPL-MCNC: 6.3 G/DL (ref 6.4–8.2)
RBC # BLD AUTO: 4.31 X10*6/UL (ref 4–5.2)
SODIUM SERPL-SCNC: 139 MMOL/L (ref 136–145)
TRIGL SERPL-MCNC: 130 MG/DL (ref 0–149)
TSH SERPL-ACNC: 1.47 MIU/L (ref 0.44–3.98)
VLDL: 26 MG/DL (ref 0–40)
WBC # BLD AUTO: 5.6 X10*3/UL (ref 4.4–11.3)

## 2024-11-26 PROCEDURE — 80061 LIPID PANEL: CPT

## 2024-11-26 PROCEDURE — 84443 ASSAY THYROID STIM HORMONE: CPT

## 2024-11-26 PROCEDURE — 80053 COMPREHEN METABOLIC PANEL: CPT

## 2024-11-26 PROCEDURE — 85027 COMPLETE CBC AUTOMATED: CPT

## 2024-11-26 PROCEDURE — 36415 COLL VENOUS BLD VENIPUNCTURE: CPT

## 2024-11-26 PROCEDURE — 82306 VITAMIN D 25 HYDROXY: CPT

## 2024-12-12 ENCOUNTER — TELEPHONE (OUTPATIENT)
Dept: PRIMARY CARE | Facility: CLINIC | Age: 34
End: 2024-12-12
Payer: COMMERCIAL

## 2024-12-12 NOTE — TELEPHONE ENCOUNTER
----- Message from Halima Lew sent at 12/11/2024  3:05 PM EST -----  Please notify patient that her cardiac monitor did not show any abnormal arrhythmias.  Overall it was benign and showed no significant cardiac arrhythmias.

## 2024-12-12 NOTE — TELEPHONE ENCOUNTER
Patient receive message through [employer] and it show it has been seen by the patient.

## 2024-12-18 ENCOUNTER — APPOINTMENT (OUTPATIENT)
Dept: PRIMARY CARE | Facility: CLINIC | Age: 34
End: 2024-12-18
Payer: COMMERCIAL

## 2024-12-23 DIAGNOSIS — D64.9 ANEMIA, UNSPECIFIED TYPE: Primary | ICD-10-CM

## 2025-01-27 ENCOUNTER — OFFICE VISIT (OUTPATIENT)
Dept: URGENT CARE | Age: 35
End: 2025-01-27
Payer: COMMERCIAL

## 2025-01-27 VITALS
TEMPERATURE: 98.2 F | RESPIRATION RATE: 16 BRPM | OXYGEN SATURATION: 98 % | DIASTOLIC BLOOD PRESSURE: 83 MMHG | HEART RATE: 98 BPM | WEIGHT: 157 LBS | BODY MASS INDEX: 30.66 KG/M2 | SYSTOLIC BLOOD PRESSURE: 131 MMHG

## 2025-01-27 DIAGNOSIS — J10.1 INFLUENZA A: Primary | ICD-10-CM

## 2025-01-27 DIAGNOSIS — R05.9 COUGH, UNSPECIFIED TYPE: ICD-10-CM

## 2025-01-27 DIAGNOSIS — J02.9 SORE THROAT: ICD-10-CM

## 2025-01-27 DIAGNOSIS — Z20.822 EXPOSURE TO COVID-19 VIRUS: ICD-10-CM

## 2025-01-27 LAB
POC RAPID INFLUENZA A: POSITIVE
POC RAPID INFLUENZA B: NEGATIVE
POC RAPID STREP: NEGATIVE
POC SARS-COV-2 AG BINAX: NORMAL

## 2025-01-27 PROCEDURE — 87880 STREP A ASSAY W/OPTIC: CPT | Performed by: NURSE PRACTITIONER

## 2025-01-27 PROCEDURE — 3079F DIAST BP 80-89 MM HG: CPT | Performed by: NURSE PRACTITIONER

## 2025-01-27 PROCEDURE — 87804 INFLUENZA ASSAY W/OPTIC: CPT | Performed by: NURSE PRACTITIONER

## 2025-01-27 PROCEDURE — 1036F TOBACCO NON-USER: CPT | Performed by: NURSE PRACTITIONER

## 2025-01-27 PROCEDURE — 99214 OFFICE O/P EST MOD 30 MIN: CPT | Performed by: NURSE PRACTITIONER

## 2025-01-27 PROCEDURE — 3075F SYST BP GE 130 - 139MM HG: CPT | Performed by: NURSE PRACTITIONER

## 2025-01-27 PROCEDURE — 87811 SARS-COV-2 COVID19 W/OPTIC: CPT | Performed by: NURSE PRACTITIONER

## 2025-01-27 RX ORDER — OSELTAMIVIR PHOSPHATE 75 MG/1
75 CAPSULE ORAL EVERY 12 HOURS
Qty: 10 CAPSULE | Refills: 0 | Status: SHIPPED | OUTPATIENT
Start: 2025-01-27 | End: 2025-02-01

## 2025-01-27 ASSESSMENT — ENCOUNTER SYMPTOMS
MUSCULOSKELETAL NEGATIVE: 1
EYES NEGATIVE: 1
RESPIRATORY NEGATIVE: 1
NEUROLOGICAL NEGATIVE: 1
CHILLS: 1
GASTROINTESTINAL NEGATIVE: 1
FEVER: 1
SORE THROAT: 1
ALLERGIC/IMMUNOLOGIC NEGATIVE: 1
HEMATOLOGIC/LYMPHATIC NEGATIVE: 1
PSYCHIATRIC NEGATIVE: 1
ENDOCRINE NEGATIVE: 1
CARDIOVASCULAR NEGATIVE: 1

## 2025-01-27 NOTE — PROGRESS NOTES
Subjective   Patient ID: Elizabeth Jorge is a 34 y.o. female. They present today with a chief complaint of Sore Throat (Body aches, chills, fever, cough x 3 days. /Son positive Strep).    History of Present Illness  Patient is a 35 y/o female c/o sore throat, fever, chills, bodyaches x3 days. Patient with exposure to strep throat in the household. Patient denies symptoms of lethargy, weakness, chest pain/tightness/pressure, SOB, wheezing, N/V/D, ABD pain. No OTC medication reported to be taken.       Sore Throat         Past Medical History  Allergies as of 2025 - Reviewed 2025   Allergen Reaction Noted    Hydrocodone Itching 2017       (Not in a hospital admission)       Past Medical History:   Diagnosis Date    Acute pharyngitis, unspecified 2016    Acute viral pharyngitis    Acute upper respiratory infection, unspecified 2016    Viral URI with cough    Encounter for screening for malignant neoplasm of vagina     Vaginal Pap smear    Other conditions influencing health status 2018    History of pregnancy    Personal history of diseases of the blood and blood-forming organs and certain disorders involving the immune mechanism 2014    History of anemia    Personal history of other diseases of the respiratory system 05/15/2018    History of sinusitis    Personal history of other diseases of the respiratory system 2017    History of paranasal sinus congestion    Unspecified conjunctivitis 2017    Bacterial conjunctivitis of left eye    Urinary tract infection, site not specified 2017    Bacterial UTI       Past Surgical History:   Procedure Laterality Date     SECTION, CLASSIC  2020     Section    OTHER SURGICAL HISTORY  2017    Cervical Conization    WISDOM TOOTH EXTRACTION          reports that she quit smoking about 13 years ago. Her smoking use included cigarettes. She has been exposed to tobacco smoke. She has never used  smokeless tobacco. She reports current alcohol use of about 3.0 standard drinks of alcohol per week. She reports that she does not use drugs.    Review of Systems  Review of Systems   Constitutional:  Positive for chills and fever.        Bodyaches   HENT:  Positive for sore throat.    Eyes: Negative.    Respiratory: Negative.     Cardiovascular: Negative.    Gastrointestinal: Negative.    Endocrine: Negative.    Genitourinary: Negative.    Musculoskeletal: Negative.    Skin: Negative.    Allergic/Immunologic: Negative.    Neurological: Negative.    Hematological: Negative.    Psychiatric/Behavioral: Negative.                                    Objective    Vitals:    01/27/25 0904   BP: 131/83   Pulse: 98   Resp: 16   Temp: 36.8 °C (98.2 °F)   SpO2: 98%   Weight: 71.2 kg (157 lb)     No LMP recorded.    Physical Exam  Constitutional:       Comments: Patient A/O x4, LOC 5, calm and cooperative. Patient self-ambulatory to treatment area and is in no acute distress.    HENT:      Head: Normocephalic and atraumatic.      Right Ear: Tympanic membrane normal.      Left Ear: Tympanic membrane normal.      Nose:      Comments: No edema, erythema to bilateral inferior turbinates. Trace amounts of serous drainage from nares. No frontal or maxillary sinus pressure to palpation      Mouth/Throat:      Comments: Posterior pharynx erythematous without tonsillar enlargement or exudates. Uvula midline. No petechiae to palates.   Eyes:      Extraocular Movements: Extraocular movements intact.      Conjunctiva/sclera: Conjunctivae normal.      Pupils: Pupils are equal, round, and reactive to light.   Cardiovascular:      Rate and Rhythm: Normal rate and regular rhythm.      Pulses: Normal pulses.      Heart sounds: Normal heart sounds.   Pulmonary:      Comments: Audible infrequent, dry cough present during physical examination. All lungfields clear to roomair per auscultation. Patient speaking in complete sentences without SOB or  difficulty. Patient in no acute respiratory distress   Abdominal:      General: Abdomen is flat.      Palpations: Abdomen is soft.   Musculoskeletal:         General: Normal range of motion.      Cervical back: Neck supple.   Skin:     General: Skin is warm and dry.      Capillary Refill: Capillary refill takes less than 2 seconds.   Neurological:      General: No focal deficit present.      Mental Status: She is oriented to person, place, and time.   Psychiatric:         Mood and Affect: Mood normal.         Behavior: Behavior normal.         Procedures    Point of Care Test & Imaging Results from this visit  Results for orders placed or performed in visit on 01/27/25   POCT rapid strep A manually resulted   Result Value Ref Range    POC Rapid Strep Negative Negative   POCT Covid-19 Rapid Antigen   Result Value Ref Range    POC JOSEY-COV-2 AG  Presumptive negative test for SARS-CoV-2 (no antigen detected)     Presumptive negative test for SARS-CoV-2 (no antigen detected)   POCT Influenza A/B manually resulted   Result Value Ref Range    POC Rapid Influenza A Positive (A) Negative    POC Rapid Influenza B Negative Negative      No results found.    Diagnostic study results (if any) were reviewed by TUSHAR Romero.    Assessment/Plan   Allergies, medications, history, and pertinent labs/EKGs/Imaging reviewed by TUSHAR Romero.     Medical Decision Making  Influenza A: Positive  COVID-19, Influenza B, Rapid Strep negative in office    Will send Rx of Tamiflu for patient; symptoms and illness trajectory discussed with patient. OTC Motrin/Tylenol to be taken as needed.     At time of discharge, patient was clinically well-appearing and appropriate for outpatient management. The patient/parent/guardian was educated regarding diagnosis, supportive care, OTC and Rx medications. The patient/parent/guardian was given the opportunity to ask questions prior to discharge. They verbalized understanding of discussion  of treatment plan, expected course of illness and/or injury, indications on when to return to , when to seek further evaluation in ED/call 911, and the need to follow up with PCP and/or specialist as referred. Patient/parent/guardian was provided with work/school documentation if requested. Patient stable upon discharge.     Orders and Diagnoses  Diagnoses and all orders for this visit:  Influenza A  -     oseltamivir (Tamiflu) 75 mg capsule; Take 1 capsule (75 mg) by mouth every 12 hours for 5 days.  Sore throat  -     POCT rapid strep A manually resulted  Cough, unspecified type  -     POCT Influenza A/B manually resulted  Exposure to COVID-19 virus  -     POCT Covid-19 Rapid Antigen      Medical Admin Record      Patient disposition: Home    Electronically signed by TUSHAR Romero  9:22 AM

## 2025-02-09 ENCOUNTER — ANCILLARY PROCEDURE (OUTPATIENT)
Dept: URGENT CARE | Age: 35
End: 2025-02-09
Payer: COMMERCIAL

## 2025-02-09 ENCOUNTER — OFFICE VISIT (OUTPATIENT)
Dept: URGENT CARE | Age: 35
End: 2025-02-09
Payer: COMMERCIAL

## 2025-02-09 VITALS
DIASTOLIC BLOOD PRESSURE: 83 MMHG | RESPIRATION RATE: 14 BRPM | HEART RATE: 87 BPM | SYSTOLIC BLOOD PRESSURE: 128 MMHG | OXYGEN SATURATION: 99 % | TEMPERATURE: 97.6 F

## 2025-02-09 DIAGNOSIS — R05.1 ACUTE COUGH: ICD-10-CM

## 2025-02-09 DIAGNOSIS — J01.00 ACUTE NON-RECURRENT MAXILLARY SINUSITIS: Primary | ICD-10-CM

## 2025-02-09 DIAGNOSIS — J40 BRONCHITIS: ICD-10-CM

## 2025-02-09 PROCEDURE — 71046 X-RAY EXAM CHEST 2 VIEWS: CPT

## 2025-02-09 RX ORDER — ALBUTEROL SULFATE 90 UG/1
2 INHALANT RESPIRATORY (INHALATION) EVERY 6 HOURS PRN
Qty: 18 G | Refills: 0 | Status: SHIPPED | OUTPATIENT
Start: 2025-02-09 | End: 2026-02-09

## 2025-02-09 RX ORDER — PREDNISONE 20 MG/1
20 TABLET ORAL DAILY
Qty: 10 TABLET | Refills: 0 | Status: SHIPPED | OUTPATIENT
Start: 2025-02-09 | End: 2025-02-19

## 2025-02-09 RX ORDER — AMOXICILLIN AND CLAVULANATE POTASSIUM 875; 125 MG/1; MG/1
875 TABLET, FILM COATED ORAL 2 TIMES DAILY
Qty: 20 TABLET | Refills: 0 | Status: SHIPPED | OUTPATIENT
Start: 2025-02-09 | End: 2025-02-19

## 2025-02-09 RX ORDER — FLUTICASONE PROPIONATE 50 MCG
1 SPRAY, SUSPENSION (ML) NASAL DAILY
Qty: 16 G | Refills: 0 | Status: SHIPPED | OUTPATIENT
Start: 2025-02-09 | End: 2026-02-09

## 2025-02-09 ASSESSMENT — ENCOUNTER SYMPTOMS
CONSTIPATION: 0
SORE THROAT: 0
VERTIGO: 0
DIZZINESS: 0
FATIGUE: 0
SWOLLEN GLANDS: 0
HEADACHES: 1
ABDOMINAL PAIN: 0
SINUS COMPLAINT: 1
WHEEZING: 1
HOARSE VOICE: 0
SINUS PRESSURE: 1
LIGHT-HEADEDNESS: 0
VOMITING: 0
SINUS PAIN: 1
DIARRHEA: 0
FEVER: 0
SHORTNESS OF BREATH: 0
RHINORRHEA: 1
NAUSEA: 0
COUGH: 1
CHILLS: 0
SNORING: 1

## 2025-02-09 ASSESSMENT — PAIN SCALES - GENERAL: PAINLEVEL_OUTOF10: 0-NO PAIN

## 2025-02-10 NOTE — PROGRESS NOTES
Subjective   Patient ID: Elizabeth Jorge is a 34 y.o. female. They present today with a chief complaint of Sinus Problem (X2 weeks ) and Headache (X2 weeks ).    History of Present Illness  34 year old female presents with complaint of cough and sinus congestion. Symptoms started 2.5 weeks ago. Was diagnosed with influenza A in clinic 2 wks ago. Reports worsening of sinus pressure and pain especially on the right sinuses over the past several days. Has been taking sudafed, ibuprofen, tylenol, albuterol and saline rinses with little relief. States her cough has continued and has used an Albuterol inhaler (prescribed from a previous illness) with mild relief.       Sinus Problem  Associated symptoms: congestion, cough, headaches, rhinorrhea and wheezing    Associated symptoms: no abdominal pain, no chest pain, no diarrhea, no ear pain (ear pressure left), no fatigue, no fever, no nausea, no rash, no shortness of breath, no sore throat and no vomiting    Headache  Associated symptoms: congestion, cough and sinus pressure    Associated symptoms: no abdominal pain, no diarrhea, no dizziness, no ear pain (ear pressure left), no fatigue, no fever, no nausea, no sore throat, no swollen glands and no vomiting    Sinusitis  Pain details:     Location:  Maxillary and frontal    Quality:  Pressure and aching    Severity:  Moderate    Duration:  2 weeks    Timing:  Constant  Progression:  Worsening  Chronicity:  New  Context: recent URI    Relieved by:  Oral decongestants  Worsened by:  Lying down  Ineffective treatments:  Saline sprays, acetaminophen and oral decongestants  Associated symptoms: congestion, cough, headaches, rhinorrhea, snoring and wheezing    Associated symptoms: no chest pain, no chills, no ear pain (ear pressure left), no fatigue, no fever, no hoarse voice, no mouth breathing, no nausea, no shortness of breath, no sneezing, no sore throat, no swollen glands, no tooth pain, no vertigo and no vomiting         Past Medical History  Allergies as of 2025 - Reviewed 2025   Allergen Reaction Noted    Hydrocodone Itching 2017       (Not in a hospital admission)       Past Medical History:   Diagnosis Date    Acute pharyngitis, unspecified 2016    Acute viral pharyngitis    Acute upper respiratory infection, unspecified 2016    Viral URI with cough    Encounter for screening for malignant neoplasm of vagina     Vaginal Pap smear    Other conditions influencing health status 2018    History of pregnancy    Personal history of diseases of the blood and blood-forming organs and certain disorders involving the immune mechanism 2014    History of anemia    Personal history of other diseases of the respiratory system 05/15/2018    History of sinusitis    Personal history of other diseases of the respiratory system 2017    History of paranasal sinus congestion    Unspecified conjunctivitis 2017    Bacterial conjunctivitis of left eye    Urinary tract infection, site not specified 2017    Bacterial UTI       Past Surgical History:   Procedure Laterality Date     SECTION, CLASSIC  2020     Section    OTHER SURGICAL HISTORY  2017    Cervical Conization    WISDOM TOOTH EXTRACTION          reports that she quit smoking about 13 years ago. Her smoking use included cigarettes. She has been exposed to tobacco smoke. She has never used smokeless tobacco. She reports current alcohol use of about 3.0 standard drinks of alcohol per week. She reports that she does not use drugs.    Review of Systems  Review of Systems   Constitutional:  Negative for chills, fatigue and fever.   HENT:  Positive for congestion, rhinorrhea, sinus pressure and sinus pain. Negative for ear pain (ear pressure left), hoarse voice, sneezing and sore throat.    Respiratory:  Positive for snoring, cough and wheezing. Negative for shortness of breath.    Cardiovascular:  Negative  for chest pain.   Gastrointestinal:  Negative for abdominal pain, constipation, diarrhea, nausea and vomiting.   Skin:  Negative for rash.   Neurological:  Positive for headaches. Negative for dizziness, vertigo and light-headedness.   All other systems reviewed and are negative.        Objective    Vitals:    02/09/25 1853   BP: 128/83   BP Location: Right arm   Patient Position: Sitting   BP Cuff Size: Adult   Pulse: 87   Resp: 14   Temp: 36.4 °C (97.6 °F)   TempSrc: Temporal   SpO2: 99%     Patient's last menstrual period was 01/25/2025 (approximate).    Physical Exam  Vitals and nursing note reviewed.   Constitutional:       Appearance: Normal appearance. She is normal weight. She is not ill-appearing.   HENT:      Right Ear: Tympanic membrane, ear canal and external ear normal.      Left Ear: Ear canal and external ear normal. A middle ear effusion (cloudy) is present. Tympanic membrane is not injected, erythematous or bulging.      Nose: Congestion and rhinorrhea present.      Left Turbinates: Swollen.      Right Sinus: Maxillary sinus tenderness present. No frontal sinus tenderness.      Left Sinus: Maxillary sinus tenderness and frontal sinus tenderness present.      Comments: Mild swelling to maxillary sinus left     Mouth/Throat:      Mouth: Mucous membranes are moist.      Pharynx: Oropharynx is clear.   Eyes:      Pupils: Pupils are equal, round, and reactive to light.   Cardiovascular:      Rate and Rhythm: Normal rate and regular rhythm.      Pulses: Normal pulses.      Heart sounds: Normal heart sounds.   Pulmonary:      Effort: Pulmonary effort is normal. No respiratory distress.      Breath sounds: Normal breath sounds. No wheezing or rhonchi.   Musculoskeletal:         General: Normal range of motion.      Cervical back: Normal range of motion and neck supple.   Skin:     General: Skin is warm.   Neurological:      Mental Status: She is alert and oriented to person, place, and time.   Psychiatric:          Mood and Affect: Mood normal.         Behavior: Behavior normal.         Procedures    Point of Care Test & Imaging Results from this visit  No results found for this visit on 02/09/25.   XR chest 2 views    Result Date: 2/9/2025  Interpreted By:  Adalberto Barnes, STUDY: XR CHEST 2 VIEWS;  2/9/2025 7:20 pm   INDICATION: Signs/Symptoms:cough, SOB, wheezing s/p Influenza A dx 2wk ago.   COMPARISON: None.   ACCESSION NUMBER(S): [id number]   ORDERING CLINICIAN: CINDY VILLANUEVA   FINDINGS:     CARDIOMEDIASTINAL SILHOUETTE: Cardiomediastinal silhouette is normal in size and configuration.   LUNGS: No consolidation, pleural effusion or pneumothorax.   ABDOMEN: No remarkable upper abdominal findings.   BONES: No acute osseous abnormality.       No acute cardiopulmonary process.   MACRO: None   Signed by: Adalberto Barnes 2/9/2025 7:29 PM Dictation workstation:   [id number]     Diagnostic study results (if any) were reviewed by TUSHAR Tello.    Assessment/Plan   Allergies, medications, history, and pertinent labs/EKGs/Imaging reviewed by TUSHAR Tello.     Medical Decision Making  Based on history of persistent sinus symptoms, likely bacterial sinusitis, and bronchitis. No evidence of pneumonia, sepsis or other acute cardiopulmonary pathology. Will start on oral antibiotics and prednisone today. Encouraged patient to continue symptomatic and supportive care measures. Advised to follow-up with primary care provider if symptoms fail to improve or return to clinic with any new or worsening symptoms. Patient verbalized understanding and agreeable with plan.        Orders and Diagnoses  Diagnoses and all orders for this visit:  Acute non-recurrent maxillary sinusitis  -     amoxicillin-pot clavulanate (Augmentin) 875-125 mg tablet; Take 1 tablet (875 mg) by mouth 2 times a day for 10 days.  -     predniSONE (Deltasone) 20 mg tablet; Take 1 tablet (20 mg) by mouth once daily for 10 days.  -      fluticasone (Flonase) 50 mcg/actuation nasal spray; Administer 1 spray into each nostril once daily. Shake gently. Before first use, prime pump. After use, clean tip and replace cap.  Bronchitis  -     predniSONE (Deltasone) 20 mg tablet; Take 1 tablet (20 mg) by mouth once daily for 10 days.  -     albuterol 90 mcg/actuation inhaler; Inhale 2 puffs every 6 hours if needed for wheezing.  Acute cough  -     XR chest 2 views; Future      Medical Admin Record      Patient disposition: Home    Electronically signed by TUSHAR Tello  7:34 PM

## 2025-02-10 NOTE — PATIENT INSTRUCTIONS
In many cases, sinus infections are caused by viruses and improve without antibiotics.  Symptom Management:  Nasal Saline Rinse (e.g., NeilMed, Neti Pot) - Helps clear nasal congestion.  Flonase (Fluticasone) or Nasacort (Triamcinolone) nasal spray - Use daily to reduce inflammation.  Oral Decongestants (e.g., Pseudoephedrine/ Sudafed) - Can help with congestion but should be avoided in those with uncontrolled high blood pressure.  Antihistamines (e.g., Claritin, Zyrtec, Benadryl) - May help if allergies contribute to symptoms.  Pain Relievers (e.g., Ibuprofen, Acetaminophen) - For sinus pressure and headache relief.  Stay Hydrated - Drink plenty of fluids to thin mucus.  Rest and Humidification - Use a humidifier to keep airways moist.  When to Start the Antibiotic:  Symptoms persist beyond 10 days without improvement.  Symptoms worsen significantly after initial improvement (so-called “double worsening”).  High fever (>=102°F) with facial pain or swelling.  When to Seek Emergency Care:  Severe facial pain or swelling around the eyes.  Persistent high fever (>102°F) despite medication.  Vision changes or swelling of the eyelid.  Confusion, neck stiffness, or severe headache.  Follow up with your provider if symptoms do not improve or if you have concerns.

## 2025-04-01 LAB
FERRITIN SERPL-MCNC: 3 NG/ML (ref 16–154)
FOLATE SERPL-MCNC: 11 NG/ML
IRON SATN MFR SERPL: 4 % (CALC) (ref 16–45)
IRON SERPL-MCNC: 15 MCG/DL (ref 40–190)
TIBC SERPL-MCNC: 377 MCG/DL (CALC) (ref 250–450)
VIT B12 SERPL-MCNC: 504 PG/ML (ref 200–1100)

## 2025-04-03 DIAGNOSIS — D50.0 IRON DEFICIENCY ANEMIA DUE TO CHRONIC BLOOD LOSS: Primary | ICD-10-CM

## 2025-04-11 LAB
ERYTHROCYTE [DISTWIDTH] IN BLOOD BY AUTOMATED COUNT: 18.3 % (ref 11–15)
HCT VFR BLD AUTO: 32.8 % (ref 35–45)
HGB BLD-MCNC: 9.7 G/DL (ref 11.7–15.5)
MCH RBC QN AUTO: 21.8 PG (ref 27–33)
MCHC RBC AUTO-ENTMCNC: 29.6 G/DL (ref 32–36)
MCV RBC AUTO: 73.7 FL (ref 80–100)
PLATELET # BLD AUTO: 271 THOUSAND/UL (ref 140–400)
PMV BLD REES-ECKER: 10.7 FL (ref 7.5–12.5)
RBC # BLD AUTO: 4.45 MILLION/UL (ref 3.8–5.1)
WBC # BLD AUTO: 4.4 THOUSAND/UL (ref 3.8–10.8)

## 2025-04-21 ENCOUNTER — TELEPHONE (OUTPATIENT)
Dept: PRIMARY CARE | Facility: CLINIC | Age: 35
End: 2025-04-21

## 2025-04-21 DIAGNOSIS — D64.9 ANEMIA, UNSPECIFIED TYPE: Primary | ICD-10-CM

## 2025-04-21 NOTE — TELEPHONE ENCOUNTER
----- Message from Halima eLw sent at 4/19/2025 10:51 AM EDT -----  Notify patient she is still significantly anemic .  Her Hgb is only 9.7.  I know she has heavy menses BUT I am concerned and still thinks she needs a GI eval since she has never had one.  Is she willing to see GI if I make the referral?  ----- Message -----  From: Agnieszka [employer] Results In  Sent: 4/11/2025  11:14 PM EDT  To: Halima Lew MD

## 2025-07-03 ENCOUNTER — APPOINTMENT (OUTPATIENT)
Dept: GASTROENTEROLOGY | Facility: CLINIC | Age: 35
End: 2025-07-03

## 2025-07-03 VITALS
HEART RATE: 77 BPM | BODY MASS INDEX: 24.11 KG/M2 | WEIGHT: 122.8 LBS | HEIGHT: 60 IN | SYSTOLIC BLOOD PRESSURE: 142 MMHG | DIASTOLIC BLOOD PRESSURE: 96 MMHG

## 2025-07-03 DIAGNOSIS — D64.9 ANEMIA, UNSPECIFIED TYPE: Primary | ICD-10-CM

## 2025-07-03 DIAGNOSIS — K21.9 GASTROESOPHAGEAL REFLUX DISEASE, UNSPECIFIED WHETHER ESOPHAGITIS PRESENT: ICD-10-CM

## 2025-07-03 LAB
ERYTHROCYTE [DISTWIDTH] IN BLOOD BY AUTOMATED COUNT: 17.3 % (ref 11–15)
FERRITIN SERPL-MCNC: 6 NG/ML (ref 16–154)
HCT VFR BLD AUTO: 39.3 % (ref 35–45)
HGB BLD-MCNC: 12.2 G/DL (ref 11.7–15.5)
IRON SATN MFR SERPL: 11 % (CALC) (ref 16–45)
IRON SERPL-MCNC: 38 MCG/DL (ref 40–190)
MCH RBC QN AUTO: 25.6 PG (ref 27–33)
MCHC RBC AUTO-ENTMCNC: 31 G/DL (ref 32–36)
MCV RBC AUTO: 82.6 FL (ref 80–100)
PLATELET # BLD AUTO: 270 THOUSAND/UL (ref 140–400)
PMV BLD REES-ECKER: 10.5 FL (ref 7.5–12.5)
RBC # BLD AUTO: 4.76 MILLION/UL (ref 3.8–5.1)
TIBC SERPL-MCNC: 341 MCG/DL (CALC) (ref 250–450)
WBC # BLD AUTO: 5.6 THOUSAND/UL (ref 3.8–10.8)

## 2025-07-03 PROCEDURE — 3077F SYST BP >= 140 MM HG: CPT | Performed by: INTERNAL MEDICINE

## 2025-07-03 PROCEDURE — 3080F DIAST BP >= 90 MM HG: CPT | Performed by: INTERNAL MEDICINE

## 2025-07-03 PROCEDURE — 1036F TOBACCO NON-USER: CPT | Performed by: INTERNAL MEDICINE

## 2025-07-03 PROCEDURE — 99214 OFFICE O/P EST MOD 30 MIN: CPT | Performed by: INTERNAL MEDICINE

## 2025-07-03 PROCEDURE — 3008F BODY MASS INDEX DOCD: CPT | Performed by: INTERNAL MEDICINE

## 2025-07-03 RX ORDER — OMEPRAZOLE 20 MG/1
20 CAPSULE, DELAYED RELEASE ORAL
Qty: 30 CAPSULE | Refills: 2 | Status: SHIPPED | OUTPATIENT
Start: 2025-07-03 | End: 2025-08-02

## 2025-07-03 NOTE — PROGRESS NOTES
Department of Gastroenterology & Hepatology  Initial Consult Note  Date of Service:  July 3, 2025         Patient: Elizabeth Jorge    Medical Record: [id number]  Reason for Admission / Consultation: anemia  Gastroenterology Attending: Dave Ly MD  Referring Provider: MD Halima Gomez MD  [address]  [facility], [address]  [place] [address]         My final recommendations will be communicated back to the requesting physician by way of shared medical record or letter via [place] mail         Impression: 35-year-old pleasant female with past medical surgical history of acid reflux, heavy menstrual periods here for anemia and GERD    Anemia  Microcytic  Likely from heavy menstrual period this but will rule out GI etiologies as well  Schedule EGD and colonoscopy, if negative follow-up with OB/GYN for heavy menstrual periods treatment  Blood work  Continue iron supplementation    GERD  Start omeprazole 20 mg daily    Follow-up in 3 months    Dave Ly MD  Gastroenterology, Hepatology and Nutrition  [facility]  =========================================================================  History of Present Illness:     Elizabeth Jorge  is a 35 y.o.   has a past medical history of Abnormal Pap smear of cervix, Acute pharyngitis, unspecified (11/27/2016), Acute upper respiratory infection, unspecified (11/27/2016), Anemia, Encounter for screening for malignant neoplasm of vagina, GERD (gastroesophageal reflux disease), HPV (human papilloma virus) infection, Other conditions influencing health status (03/27/2018), Personal history of diseases of the blood and blood-forming organs and certain disorders involving the immune mechanism (11/19/2014), Personal history of other diseases of the respiratory system (05/15/2018), Personal history of other diseases of the respiratory system (06/01/2017), Unspecified conjunctivitis (06/01/2017), and Urinary tract infection, site not  specified (2017). who presents for microcytic anemia.  Patient states that she was noted to be anemic in October last year which she thought was from having heavy menstrual periods for many years.  She started taking iron about a month ago per her PCP is worried she could be having GI etiology of anemia as well for which she was referred here.   Patient denies any nausea, vomiting, abdominal pain.  He does complain of acid reflux and takes Tums or Pepcid 2-3 times per week.  He was taking ibuprofen daily for about a year but stopped that many months ago  Having 1 bowel movement per day, she is formed and brown.  Denies any blood in stool or black color in stool.     Social alcohol, no smoking   Mom has UC  Family history of colon or stomach cancer       Pertinent Review of Systems:    Per HPI rest 12 point ROS negative      Past Medical History:    Medical History[1]     Past Surgical History:  Surgical History[2]     Family History:  Family History[3]     Social History:  Social History     Tobacco Use    Smoking status: Former     Current packs/day: 0.00     Types: Cigarettes     Quit date: 4/3/2012     Years since quittin.2     Passive exposure: Past    Smokeless tobacco: Never   Substance Use Topics    Alcohol use: Yes     Alcohol/week: 2.0 standard drinks of alcohol     Types: 2 Cans of beer per week        Allergies:  RX Allergies[4]      Medications:  Medications Ordered Prior to Encounter[5]         Physical Exam:  BP (!) 142/96 (BP Location: Left arm, Patient Position: Sitting) Comment: pt denies any sob or chest pain  Pulse 77   Ht (!) 1.524 m (5')   Wt 55.7 kg (122 lb 12.8 oz)   BMI 23.98 kg/m²    General appearance: well appearing, alert, in no acute distress  Skin:  no rashes or lesions  Head: normal  Eyes: Anicteric sclera.   ENT: No oral erythema  Lungs: lungs clear to auscultation, no wheezing or rhonchi  Heart: RRR without murmur  Abdomen:  Abdomen soft, non-tender. Bowel sounds  normal.   Extremities: Extremities normal.   Musculoskeletal: Muscular strength grossly intact  Neuro: CN2-12 grossly intact     Labs:  Current Medications[6]         SIGNATURE: Dave Ly MD PATIENT NAME: Elizabeth Jorge   DATE: July 3, 2025 MRN: [id number]   TIME: 3:05 PM           [1]   Past Medical History:  Diagnosis Date    Abnormal Pap smear of cervix     Acute pharyngitis, unspecified 2016    Acute viral pharyngitis    Acute upper respiratory infection, unspecified 2016    Viral URI with cough    Anemia     Encounter for screening for malignant neoplasm of vagina     Vaginal Pap smear    GERD (gastroesophageal reflux disease)     HPV (human papilloma virus) infection     Other conditions influencing health status 2018    History of pregnancy    Personal history of diseases of the blood and blood-forming organs and certain disorders involving the immune mechanism 2014    History of anemia    Personal history of other diseases of the respiratory system 05/15/2018    History of sinusitis    Personal history of other diseases of the respiratory system 2017    History of paranasal sinus congestion    Unspecified conjunctivitis 2017    Bacterial conjunctivitis of left eye    Urinary tract infection, site not specified 2017    Bacterial UTI   [2]   Past Surgical History:  Procedure Laterality Date     SECTION, CLASSIC  2020     Section     SECTION, LOW TRANSVERSE      COLPOSCOPY      OTHER SURGICAL HISTORY  2017    Cervical Conization    WISDOM TOOTH EXTRACTION     [3]   Family History  Problem Relation Name Age of Onset    Breast cancer Mother  49    Sleep apnea Mother      COPD Mother      Hypertension Father      Hyperlipidemia Father      Heart failure Father      Coronary artery disease Paternal Grandfather      Hypertension Other aunt     Hypertension Other uncle     Asthma Mother Ericka     Heart disease Father Nestor      Alcohol abuse Father Nestor    [4]   Allergies  Allergen Reactions    Hydrocodone Itching   [5]   Current Outpatient Medications on File Prior to Visit   Medication Sig Dispense Refill    famotidine (Pepcid) 20 mg tablet Take 1 tablet (20 mg) by mouth once daily as needed for heartburn.      multivitamin tablet Take 1 tablet by mouth once daily.      albuterol 90 mcg/actuation inhaler Inhale 2 puffs every 6 hours if needed for wheezing. 18 g 0    fluticasone (Flonase) 50 mcg/actuation nasal spray Administer 1 spray into each nostril once daily. Shake gently. Before first use, prime pump. After use, clean tip and replace cap. 16 g 0    norethindrone (Micronor) 0.35 mg tablet Take 1 tablet (0.35 mg) over 28 days by mouth once daily. (Patient not taking: Reported on 2/9/2025) 28 tablet 11     No current facility-administered medications on file prior to visit.   [6]   Current Outpatient Medications:     famotidine (Pepcid) 20 mg tablet, Take 1 tablet (20 mg) by mouth once daily as needed for heartburn., Disp: , Rfl:     multivitamin tablet, Take 1 tablet by mouth once daily., Disp: , Rfl:     albuterol 90 mcg/actuation inhaler, Inhale 2 puffs every 6 hours if needed for wheezing., Disp: 18 g, Rfl: 0    fluticasone (Flonase) 50 mcg/actuation nasal spray, Administer 1 spray into each nostril once daily. Shake gently. Before first use, prime pump. After use, clean tip and replace cap., Disp: 16 g, Rfl: 0    norethindrone (Micronor) 0.35 mg tablet, Take 1 tablet (0.35 mg) over 28 days by mouth once daily. (Patient not taking: Reported on 2/9/2025), Disp: 28 tablet, Rfl: 11    omeprazole (PriLOSEC) 20 mg DR capsule, Take 1 capsule (20 mg) by mouth once daily in the morning. Take before meals. Do not crush or chew., Disp: 30 capsule, Rfl: 2

## 2025-08-19 ENCOUNTER — ANESTHESIA EVENT (OUTPATIENT)
Dept: GASTROENTEROLOGY | Facility: EXTERNAL LOCATION | Age: 35
End: 2025-08-19

## 2025-08-21 DIAGNOSIS — Z12.31 ENCOUNTER FOR SCREENING MAMMOGRAM FOR MALIGNANT NEOPLASM OF BREAST: ICD-10-CM

## 2025-08-21 DIAGNOSIS — Z80.3 FAMILY HISTORY OF BREAST CANCER: Primary | ICD-10-CM

## 2025-08-22 ENCOUNTER — APPOINTMENT (OUTPATIENT)
Dept: GASTROENTEROLOGY | Facility: EXTERNAL LOCATION | Age: 35
End: 2025-08-22
Payer: COMMERCIAL

## 2025-08-25 DIAGNOSIS — Z12.11 COLON CANCER SCREENING: Primary | ICD-10-CM

## 2025-08-26 RX ORDER — SODIUM, POTASSIUM,MAG SULFATES 17.5-3.13G
SOLUTION, RECONSTITUTED, ORAL ORAL
Qty: 354 ML | Refills: 0 | Status: SHIPPED | OUTPATIENT
Start: 2025-08-26 | End: 2025-08-28 | Stop reason: ALTCHOICE

## 2025-08-28 ENCOUNTER — ANESTHESIA (OUTPATIENT)
Dept: GASTROENTEROLOGY | Facility: EXTERNAL LOCATION | Age: 35
End: 2025-08-28

## 2025-08-28 ENCOUNTER — APPOINTMENT (OUTPATIENT)
Dept: GASTROENTEROLOGY | Facility: EXTERNAL LOCATION | Age: 35
End: 2025-08-28
Payer: COMMERCIAL

## 2025-08-28 VITALS
DIASTOLIC BLOOD PRESSURE: 91 MMHG | HEART RATE: 80 BPM | WEIGHT: 116 LBS | TEMPERATURE: 97.3 F | BODY MASS INDEX: 22.78 KG/M2 | HEIGHT: 60 IN | OXYGEN SATURATION: 98 % | RESPIRATION RATE: 12 BRPM | SYSTOLIC BLOOD PRESSURE: 132 MMHG

## 2025-08-28 DIAGNOSIS — D64.9 ANEMIA, UNSPECIFIED TYPE: ICD-10-CM

## 2025-08-28 LAB — PREGNANCY TEST URINE, POC: NEGATIVE

## 2025-08-28 PROCEDURE — 45385 COLONOSCOPY W/LESION REMOVAL: CPT | Performed by: INTERNAL MEDICINE

## 2025-08-28 PROCEDURE — 43239 EGD BIOPSY SINGLE/MULTIPLE: CPT | Performed by: INTERNAL MEDICINE

## 2025-08-28 RX ORDER — PROPOFOL 10 MG/ML
INJECTION, EMULSION INTRAVENOUS AS NEEDED
Status: DISCONTINUED | OUTPATIENT
Start: 2025-08-28 | End: 2025-08-28

## 2025-08-28 RX ORDER — LIDOCAINE HYDROCHLORIDE 20 MG/ML
INJECTION, SOLUTION INFILTRATION; PERINEURAL AS NEEDED
Status: DISCONTINUED | OUTPATIENT
Start: 2025-08-28 | End: 2025-08-28

## 2025-08-28 RX ORDER — SODIUM CHLORIDE 9 MG/ML
INJECTION, SOLUTION INTRAVENOUS CONTINUOUS PRN
Status: DISCONTINUED | OUTPATIENT
Start: 2025-08-28 | End: 2025-08-28

## 2025-08-28 RX ADMIN — PROPOFOL 50 MG: 10 INJECTION, EMULSION INTRAVENOUS at 10:43

## 2025-08-28 RX ADMIN — PROPOFOL 50 MG: 10 INJECTION, EMULSION INTRAVENOUS at 10:34

## 2025-08-28 RX ADMIN — PROPOFOL 50 MG: 10 INJECTION, EMULSION INTRAVENOUS at 10:32

## 2025-08-28 RX ADMIN — LIDOCAINE HYDROCHLORIDE 5 ML: 20 INJECTION, SOLUTION INFILTRATION; PERINEURAL at 10:31

## 2025-08-28 RX ADMIN — PROPOFOL 50 MG: 10 INJECTION, EMULSION INTRAVENOUS at 10:38

## 2025-08-28 RX ADMIN — PROPOFOL 50 MG: 10 INJECTION, EMULSION INTRAVENOUS at 10:33

## 2025-08-28 RX ADMIN — SODIUM CHLORIDE: 9 INJECTION, SOLUTION INTRAVENOUS at 10:31

## 2025-08-28 RX ADMIN — PROPOFOL 100 MG: 10 INJECTION, EMULSION INTRAVENOUS at 10:31

## 2025-08-28 RX ADMIN — PROPOFOL 50 MG: 10 INJECTION, EMULSION INTRAVENOUS at 10:36

## 2025-08-28 SDOH — HEALTH STABILITY: MENTAL HEALTH: CURRENT SMOKER: 0

## 2025-08-28 ASSESSMENT — PAIN SCALES - GENERAL
PAINLEVEL_OUTOF10: 0 - NO PAIN
PAIN_LEVEL: 0

## 2025-08-28 ASSESSMENT — PAIN - FUNCTIONAL ASSESSMENT
PAIN_FUNCTIONAL_ASSESSMENT: 0-10